# Patient Record
Sex: MALE | Race: WHITE | Employment: FULL TIME | URBAN - METROPOLITAN AREA
[De-identification: names, ages, dates, MRNs, and addresses within clinical notes are randomized per-mention and may not be internally consistent; named-entity substitution may affect disease eponyms.]

---

## 2020-01-16 ENCOUNTER — TRANSFERRED RECORDS (OUTPATIENT)
Dept: HEALTH INFORMATION MANAGEMENT | Facility: CLINIC | Age: 45
End: 2020-01-16

## 2020-01-21 ENCOUNTER — TRANSCRIBE ORDERS (OUTPATIENT)
Dept: OTHER | Age: 45
End: 2020-01-21

## 2020-01-21 ENCOUNTER — TRANSFERRED RECORDS (OUTPATIENT)
Dept: HEALTH INFORMATION MANAGEMENT | Facility: CLINIC | Age: 45
End: 2020-01-21

## 2020-01-21 ENCOUNTER — MEDICAL CORRESPONDENCE (OUTPATIENT)
Dept: HEALTH INFORMATION MANAGEMENT | Facility: CLINIC | Age: 45
End: 2020-01-21

## 2020-01-21 DIAGNOSIS — D64.9 ANEMIA: Primary | ICD-10-CM

## 2020-01-28 NOTE — TELEPHONE ENCOUNTER
ONCOLOGY INTAKE: Records Information      APPT INFORMATION: [id number] Dr. Taryn Zimmerman  Referring provider:  Dr. Sharri Rodriguez  Referring provider s clinic:  [facility]  Reason for visit/diagnosis:  Chronic Anemia and Iron Deficiency  Has patient been notified of appointment date and time?: Yes    RECORDS INFORMATION:  Were the records received with the referral (via [employer])? Yes    Has patient been seen for any external appt for this diagnosis? Yes    If yes, where? [facility]    Has patient had any imaging or procedures outside of [place] for this condition? Yes      If Yes, where? [facility]    ADDITIONAL INFORMATION:  None

## 2020-01-29 NOTE — TELEPHONE ENCOUNTER
RECORDS STATUS - ALL OTHER DIAGNOSIS      RECORDS RECEIVED FROM: [facility] and [facility]   DATE RECEIVED: 2/19/2020   NOTES STATUS DETAILS   OFFICE NOTE from referring provider Complete  Dr. Sharri Rodriguez   OFFICE NOTE from medical oncologist     DISCHARGE SUMMARY from hospital N/A    DISCHARGE REPORT from the ER     OPERATIVE REPORT N/A    MEDICATION LIST N/A    CLINICAL TRIAL TREATMENTS TO DATE     LABS     PATHOLOGY REPORTS     ANYTHING RELATED TO DIAGNOSIS Complete Report in [facility]     1/16/2020   GENONOMIC TESTING     TYPE:     IMAGING (NEED IMAGES & REPORT)     CT SCANS N/A    MRI     MAMMO     ULTRASOUND     PET       Action    Action Taken 1/29/2020 9:53am     I called pt West- his phone went to . Outside records already in Epic.

## 2020-02-19 ENCOUNTER — ONCOLOGY VISIT (OUTPATIENT)
Dept: ONCOLOGY | Facility: CLINIC | Age: 45
End: 2020-02-19
Attending: FAMILY MEDICINE
Payer: COMMERCIAL

## 2020-02-19 ENCOUNTER — PRE VISIT (OUTPATIENT)
Dept: ONCOLOGY | Facility: CLINIC | Age: 45
End: 2020-02-19

## 2020-02-19 VITALS
OXYGEN SATURATION: 97 % | TEMPERATURE: 98.4 F | WEIGHT: 155.4 LBS | HEART RATE: 90 BPM | SYSTOLIC BLOOD PRESSURE: 124 MMHG | RESPIRATION RATE: 16 BRPM | DIASTOLIC BLOOD PRESSURE: 75 MMHG

## 2020-02-19 DIAGNOSIS — D50.9 IRON DEFICIENCY ANEMIA, UNSPECIFIED IRON DEFICIENCY ANEMIA TYPE: ICD-10-CM

## 2020-02-19 DIAGNOSIS — D64.9 ANEMIA, UNSPECIFIED TYPE: Primary | ICD-10-CM

## 2020-02-19 LAB
ALBUMIN UR-MCNC: NEGATIVE MG/DL
ANISOCYTOSIS BLD QL SMEAR: ABNORMAL
APPEARANCE UR: CLEAR
BACTERIA #/AREA URNS HPF: ABNORMAL /HPF
BASOPHILS # BLD AUTO: 0.1 10E9/L (ref 0–0.2)
BASOPHILS NFR BLD AUTO: 2 %
BILIRUB UR QL STRIP: NEGATIVE
BURR CELLS BLD QL SMEAR: ABNORMAL
COLOR UR AUTO: YELLOW
DIFFERENTIAL METHOD BLD: ABNORMAL
EOSINOPHIL # BLD AUTO: 0.1 10E9/L (ref 0–0.7)
EOSINOPHIL NFR BLD AUTO: 2 %
ERYTHROCYTE [DISTWIDTH] IN BLOOD BY AUTOMATED COUNT: 21.1 % (ref 10–15)
FOLATE SERPL-MCNC: 17.4 NG/ML
GLUCOSE UR STRIP-MCNC: NEGATIVE MG/DL
HCT VFR BLD AUTO: 36.3 % (ref 40–53)
HGB BLD-MCNC: 10 G/DL (ref 13.3–17.7)
HGB UR QL STRIP: NEGATIVE
HYPOCHROMIA BLD QL: PRESENT
KETONES UR STRIP-MCNC: NEGATIVE MG/DL
LEUKOCYTE ESTERASE UR QL STRIP: NEGATIVE
LYMPHOCYTES # BLD AUTO: 3.8 10E9/L (ref 0.8–5.3)
LYMPHOCYTES NFR BLD AUTO: 56 %
MCH RBC QN AUTO: 18.1 PG (ref 26.5–33)
MCHC RBC AUTO-ENTMCNC: 27.5 G/DL (ref 31.5–36.5)
MCV RBC AUTO: 66 FL (ref 78–100)
MONOCYTES # BLD AUTO: 0.4 10E9/L (ref 0–1.3)
MONOCYTES NFR BLD AUTO: 6 %
MUCOUS THREADS #/AREA URNS LPF: PRESENT /LPF
NEUTROPHILS # BLD AUTO: 2.2 10E9/L (ref 1.6–8.3)
NEUTROPHILS NFR BLD AUTO: 34 %
NITRATE UR QL: NEGATIVE
NON-SQ EPI CELLS #/AREA URNS LPF: ABNORMAL /LPF
PH UR STRIP: 5.5 PH (ref 5–7)
PLATELET # BLD AUTO: 582 10E9/L (ref 150–450)
RBC # BLD AUTO: 5.51 10E12/L (ref 4.4–5.9)
RBC #/AREA URNS AUTO: ABNORMAL /HPF
RBC INCLUSIONS BLD: SLIGHT
RETICS # AUTO: 95.9 10E9/L (ref 25–95)
RETICS/RBC NFR AUTO: 1.7 % (ref 0.5–2)
SOURCE: ABNORMAL
SP GR UR STRIP: 1.02 (ref 1–1.03)
TARGETS BLD QL SMEAR: ABNORMAL
UROBILINOGEN UR STRIP-MCNC: NORMAL MG/DL (ref 0–2)
VIT B12 SERPL-MCNC: 504 PG/ML (ref 193–986)
WBC # BLD AUTO: 6.6 10E9/L (ref 4–11)
WBC #/AREA URNS AUTO: ABNORMAL /HPF

## 2020-02-19 PROCEDURE — 82746 ASSAY OF FOLIC ACID SERUM: CPT | Performed by: INTERNAL MEDICINE

## 2020-02-19 PROCEDURE — 85060 BLOOD SMEAR INTERPRETATION: CPT | Performed by: INTERNAL MEDICINE

## 2020-02-19 PROCEDURE — 85025 COMPLETE CBC W/AUTO DIFF WBC: CPT | Performed by: INTERNAL MEDICINE

## 2020-02-19 PROCEDURE — 85045 AUTOMATED RETICULOCYTE COUNT: CPT | Performed by: INTERNAL MEDICINE

## 2020-02-19 PROCEDURE — 36415 COLL VENOUS BLD VENIPUNCTURE: CPT | Performed by: INTERNAL MEDICINE

## 2020-02-19 PROCEDURE — 99204 OFFICE O/P NEW MOD 45 MIN: CPT | Performed by: INTERNAL MEDICINE

## 2020-02-19 PROCEDURE — 82607 VITAMIN B-12: CPT | Performed by: INTERNAL MEDICINE

## 2020-02-19 PROCEDURE — 81001 URINALYSIS AUTO W/SCOPE: CPT | Performed by: INTERNAL MEDICINE

## 2020-02-19 RX ORDER — HEPARIN SODIUM,PORCINE 10 UNIT/ML
5 VIAL (ML) INTRAVENOUS
Status: CANCELLED | OUTPATIENT
Start: 2020-02-19

## 2020-02-19 RX ORDER — HEPARIN SODIUM (PORCINE) LOCK FLUSH IV SOLN 100 UNIT/ML 100 UNIT/ML
5 SOLUTION INTRAVENOUS
Status: CANCELLED | OUTPATIENT
Start: 2020-02-19

## 2020-02-19 ASSESSMENT — PAIN SCALES - GENERAL: PAINLEVEL: NO PAIN (0)

## 2020-02-19 NOTE — PATIENT INSTRUCTIONS
Labs today including urinalysis    Schedule Infed    Refer to GI for iron deficiency anemia workup    In 2 months, repeat labs and see me a few days after that

## 2020-02-19 NOTE — PROGRESS NOTES
Hematology initial visit:  Date on this visit: 2020    West Trujillo  is referred by Dr.Kelly CARLITO Rodriguez for a hematology consultation. He requires evaluation for iron deficiency anemia.    Primary Physician: Sharri Rodriguez     History Of Present Illness:  Mr. Trujillo is a 44 year old male who has a history of congenital asplenia and iron deficiency anemia since at least his teenage years.  He tells me that as a teenager he had colonoscopy, EGD and capsule endoscopy and these were unremarkable but I do not have those records.  Previously tissue transglutaminase antibodies were negative.  Hemoglobin electrophoresis was essentially unremarkable with decrease in A2 but this was at the time when he was iron deficient. He was tested for alpha globin gene deletions/duplications and these were negative.     He comes in today and tells me that he has had iron deficiency anemia since his teenage years.  In the past he has tried oral iron but he was unable to tolerate it because of severe GI upset.    He has been feeling fatigued and has some dyspnea on exertion and this is going on for quite some time.  He has had pica symptoms with craving for ice chips for more than 10 years.  He denies any restless legs.  He denies any issues with melena or hematochezia.  Denies nausea vomiting diarrhea constipation.  No B symptoms.  No new swellings.  No neuropathy.  No infections.  His primary care physician gave him immunizations related to asplenia.    ROS:  A comprehensive ROS was otherwise neg      Past Medical/Surgical History:  No past medical history on file.  No past surgical history on file.   Congenital splenia    Allergies:  Allergies as of 2020     (No Known Allergies)     Current Medications:  No current outpatient medications on file.    none    Family History:  No family history on file.    He mentions that his brother and sister were also born without a spleen.  Brother  of meningococcal meningitis at   years.  Her sister who is 40 years of age was recently found to have iron deficiency anemia and is getting iron infusions.  Patient has 2 children and he believes that his son definitely has spleen but he is not sure of his daughter.  Both are otherwise healthy.    Social History:  Social History     Socioeconomic History     Marital status:      Spouse name: Not on file     Number of children: Not on file     Years of education: Not on file     Highest education level: Not on file   Occupational History     Not on file   Social Needs     Financial resource strain: Not on file     Food insecurity:     Worry: Not on file     Inability: Not on file     Transportation needs:     Medical: Not on file     Non-medical: Not on file   Tobacco Use     Smoking status: Not on file   Substance and Sexual Activity     Alcohol use: Not on file     Drug use: Not on file     Sexual activity: Not on file   Lifestyle     Physical activity:     Days per week: Not on file     Minutes per session: Not on file     Stress: Not on file   Relationships     Social connections:     Talks on phone: Not on file     Gets together: Not on file     Attends [employer] service: Not on file     Active member of club or organization: Not on file     Attends meetings of clubs or organizations: Not on file     Relationship status: Not on file     Intimate partner violence:     Fear of current or ex partner: Not on file     Emotionally abused: Not on file     Physically abused: Not on file     Forced sexual activity: Not on file   Other Topics Concern     Not on file   Social History Narrative     Not on file   He used to smoke but quit 5 years ago.  Drinks on average 12 pack beer in a week.  He lives with his wife and kids.  He works as a .    Physical Exam:  /75   Pulse 90   Temp 98.4  F (36.9  C) (Oral)   Resp 16   Wt 70.5 kg (155 lb 6.4 oz)   SpO2 97%    Wt Readings from Last 4 Encounters:   02/19/20 70.5 kg (155 lb 6.4 oz)        CONSTITUTIONAL: no acute distress  EYES: PERRLA, there is palor but no icterus.   ENT/MOUTH: no mouth lesions. Ears normal  CVS: s1s2 no m r g .   RESPIRATORY: clear to auscultation b/l  GI: soft non tender no hepatosplenomegaly  NEURO: AAOX3  Grossly non focal neuro exam  INTEGUMENT: no obvious rashes  LYMPHATIC: no palpable cervical, supraclavicular, axillary or inguinal LAD  MUSCULOSKELETAL: Unremarkable. No bony tenderness.   EXTREMITIES: no edema  PSYCH: Mentation, mood and affect are normal. Decision making capacity is intact      Laboratory/Imaging Studies      Previous labs reviewed  Most recently on 1/17/2020 his white blood cell count was 6.4 with a hemoglobin of 9.9 and MCV 64 with RBC count 5.54.  Platelets were 606.  Comprehensive metabolic panel was unremarkable.  Ferritin was 8, TIBC 444, UIBC 435, iron 90, iron saturation 2%.      ASSESSMENT/PLAN:    Iron deficiency anemia.  We need to figure out the cause of iron deficiency anemia and I recommend a thorough GI evaluation.  We will check peripheral blood smear and I would also check B12 and folate levels.  Because he was unable to tolerate oral iron in the past due to GI upset, I believe he would benefit from intravenous iron.  We discussed the rational schedule and potential side effects of INFeD.  We will schedule it in the next few days.  Going forward I recommend checking labs in a couple of months.    Congenital Asplenia-fortunately he never has had serious infections due to asplenia.  His primary care physician has given him appropriate immunizations.  I wonder if the iron deficiency anemia and congenital asplenia are somewhat related to each other and I did a literature search and found a couple of interesting case reports mentioning about association of arteriovenous malformation of the gut with congenital asplenia.    A Rare Association of Congenital Asplenia with Jejunal Arteriovenous Malformation.  Am J Case Rep. 2017 Oct  19;18:0376-7243.     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  Eur J Gastroenterol Hepatol. 2011 Nov;23(12):1258-61.     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 He will follow with PCP regarding any further workup with might be recommended based on this.      I will refer him to GI as mentioned above.    Thrombocytosis.  This is likely from iron deficiency anemia as well as asplenia.  We will check peripheral blood smear as mentioned above.    I will see him back in a couple of months with repeat labs.    All questions answered.  He is agreeable and comfortable with the plan.    Taryn Zimmerman MD

## 2020-02-19 NOTE — PROGRESS NOTES
Oncology Rooming Note    February 19, 2020 2:01 PM   West Trujillo is a 44 year old male who presents for:    Chief Complaint   Patient presents with     Oncology Clinic Visit     follow up     Initial Vitals: /75   Pulse 90   Temp 98.4  F (36.9  C) (Oral)   Resp 16   Wt 70.5 kg (155 lb 6.4 oz)   SpO2 97%  There is no height or weight on file to calculate BMI. There is no height or weight on file to calculate BSA.  No Pain (0) Comment: Data Unavailable   No LMP for male patient.  Allergies reviewed: Yes  Medications reviewed: Yes    Medications: Medication refills not needed today.  Pharmacy name entered into EPIC: Data Unavailable    Clinical concerns: Shortness of breath seems to be increasing over last year. Dr Zimmerman was notified.      Starr Perez RN

## 2020-02-19 NOTE — PROGRESS NOTES
Provided education on Infed using [employer] handout dated 2/19/2020.  Discussed side effects, including infusion related reactions.  Patient brought to scheduling to make follow up appointments.  Labs to be drawn today.      Nikki Swift RN-BSN, PHN, OCN  [facility]

## 2020-02-20 LAB — COPATH REPORT: NORMAL

## 2020-02-26 ENCOUNTER — OFFICE VISIT (OUTPATIENT)
Dept: GASTROENTEROLOGY | Facility: CLINIC | Age: 45
End: 2020-02-26
Attending: INTERNAL MEDICINE
Payer: COMMERCIAL

## 2020-02-26 VITALS
WEIGHT: 156.7 LBS | SYSTOLIC BLOOD PRESSURE: 119 MMHG | HEART RATE: 91 BPM | DIASTOLIC BLOOD PRESSURE: 71 MMHG | OXYGEN SATURATION: 98 % | HEIGHT: 63 IN | BODY MASS INDEX: 27.77 KG/M2

## 2020-02-26 DIAGNOSIS — D50.9 IRON DEFICIENCY ANEMIA, UNSPECIFIED IRON DEFICIENCY ANEMIA TYPE: Primary | ICD-10-CM

## 2020-02-26 PROCEDURE — 83516 IMMUNOASSAY NONANTIBODY: CPT | Performed by: PHYSICIAN ASSISTANT

## 2020-02-26 PROCEDURE — 82784 ASSAY IGA/IGD/IGG/IGM EACH: CPT | Performed by: PHYSICIAN ASSISTANT

## 2020-02-26 PROCEDURE — 99203 OFFICE O/P NEW LOW 30 MIN: CPT | Performed by: PHYSICIAN ASSISTANT

## 2020-02-26 PROCEDURE — 36415 COLL VENOUS BLD VENIPUNCTURE: CPT | Performed by: PHYSICIAN ASSISTANT

## 2020-02-26 ASSESSMENT — MIFFLIN-ST. JEOR: SCORE: 1495.92

## 2020-02-26 ASSESSMENT — PAIN SCALES - GENERAL: PAINLEVEL: NO PAIN (0)

## 2020-02-26 NOTE — NURSING NOTE
"West Trujillo's goals for this visit include:   Chief Complaint   Patient presents with     New Patient     iron deficiency anemia       He requests these members of his care team be copied on today's visit information: yes    PCP: Sharri Rodriguez    Referring Provider:  Taryn Zimmerman MD  [address] [place] [id number]    /71 (BP Location: Left arm, Patient Position: Sitting, Cuff Size: Adult Regular)   Pulse 91   Ht 1.6 m (5' 3\")   Wt 71.1 kg (156 lb 11.2 oz)   SpO2 98%   BMI 27.76 kg/m      Do you need any medication refills at today's visit? No  Lee Kennedy student MA      "

## 2020-02-26 NOTE — PROGRESS NOTES
GASTROENTEROLOGY NEW PATIENT CLINIC VISIT    CC/REFERRING MD:    Sharri Rodriguez    REASON FOR CONSULTATION:   Referred by Taryn Zimmerman for New Patient (iron deficiency anemia)    HISTORY OF PRESENT ILLNESS:    West Trujillo is 44 year old male with congenital asplenia presents for evaluation of iron def anemia. He notes long history of anemia since adolescence. He does recall work up at that time which included an EGD and colonoscopy which he states was normal. He does not have any digestive complaints. He denies any rectal bleeding, black stools, n/v, hematemesis, reflux,constipation or diarrhea. He denies any unexpected weight loss. No hematuria or other urology symptoms. He does have some fatigue and pica symptoms. He is being scheduled for iron infusions. No previous abdominal surgeries. He is a former smoker. Denies alcohol. No NSAID use. He is not on any medications at this time. His sister also has iron def anemia. He notes that both he and his sister were born without a spleen.           PERTINENT PAST MEDICAL HISTORY:  (I personally reviewed this history with the patient at today's visit)   Past Medical History:   Diagnosis Date     Congenital asplenia      Iron deficiency anemia          PREVIOUS SURGERIES: (I personally reviewed this history with the patient at today's visit)   Past Surgical History:   Procedure Laterality Date     TONSILLECTOMY         ALLERGIES:   No Known Allergies    PERTINENT MEDICATIONS:  No current outpatient medications on file.    SOCIAL HISTORY:  Social History     Socioeconomic History     Marital status:      Spouse name: Not on file     Number of children: Not on file     Years of education: Not on file     Highest education level: Not on file   Occupational History     Not on file   Social Needs     Financial resource strain: Not on file     Food insecurity:     Worry: Not on file     Inability: Not on file     Transportation needs:     Medical:  "Not on file     Non-medical: Not on file   Tobacco Use     Smoking status: Former Smoker   Substance and Sexual Activity     Alcohol use: Not Currently     Drug use: Not on file     Sexual activity: Not on file   Lifestyle     Physical activity:     Days per week: Not on file     Minutes per session: Not on file     Stress: Not on file   Relationships     Social connections:     Talks on phone: Not on file     Gets together: Not on file     Attends Sikhism service: Not on file     Active member of club or organization: Not on file     Attends meetings of clubs or organizations: Not on file     Relationship status: Not on file     Intimate partner violence:     Fear of current or ex partner: Not on file     Emotionally abused: Not on file     Physically abused: Not on file     Forced sexual activity: Not on file   Other Topics Concern     Parent/sibling w/ CABG, MI or angioplasty before 65F 55M? Not Asked   Social History Narrative     Not on file       FAMILY HISTORY: (I personally reviewed this history with the patient at today's visit)  Family History   Problem Relation Age of Onset     Prostate Cancer Father      Lung Cancer Paternal Uncle         ROS:    No fevers or chills  No weight loss  No blurry vision, double vision or change in vision  No sore throat  No lymphadenopathy  No headache, paraesthesias, or weakness in a limb  No shortness of breath or wheezing  No chest pain or pressure  No arthralgias or myalgias  No rashes or skin changes  No odynophagia or dysphagia  No BRBPR, hematochezia, melena  No dysuria, frequency or urgency  No hot/cold intolerance or polyria  No anxiety or depression  PHYSICAL EXAMINATION:  Constitutional: aaox3, cooperative, pleasant, not dyspneic/diaphoretic, no acute distress  Vitals reviewed: /71 (BP Location: Left arm, Patient Position: Sitting, Cuff Size: Adult Regular)   Pulse 91   Ht 1.6 m (5' 3\")   Wt 71.1 kg (156 lb 11.2 oz)   SpO2 98%   BMI 27.76 kg/m     Wt: "   Wt Readings from Last 2 Encounters:   02/26/20 71.1 kg (156 lb 11.2 oz)   02/19/20 70.5 kg (155 lb 6.4 oz)          Eyes: Sclera anicteric/injected  Ears/nose/mouth/throat: Normal oropharynx without ulcers or exudate, mucus membranes moist  Neck: supple  CV: No edema, RRR  Respiratory: Unlabored breathing, CTAB  Abd:  Nondistended, no masses, +bs, no hepatosplenomegaly, nontender, no peritoneal signs  Skin: warm, perfused, no jaundice  Psych: Normal affect  MSK: Normal gait      ASSESSMENT/PLAN:    West Trujillo is a 44 year old male with congenital asplenia who presents for evaluation of iron def anemia. Referred to our GI office by oncology for GI work up. He notes FLOWER since adolescence. He has hx of extensive work up in the past without specific cause for his anemia. Previous EGD/colonoscopy as a teenager which per pt report was normal. No records are available. Recommended repeat GI work up at this time. EGD/colonoscopy orders placed. If unremarkable, then further evaluate with video capsule endoscopy to eval small bowel. Will also check celiac serology at this time.     Per oncology note, there is question whether FLOWER and congential asplenia are related as case reports have mentioned association with AVMs. See recent oncology note for more detail regarding case reports.     Further recommendations after EGD/colonoscopy.     Iron deficiency anemia, unspecified iron deficiency anemia type      Orders Placed This Encounter   Procedures     Tissue transglutaminase steff IgA and IgG     IgA     GASTROENTEROLOGY ADULT REF PROCEDURE ONLY [facility] [phone]; [facility] GI           Thank you for this consultation.  It was a pleasure to participate in the care of this patient; please contact us with any further questions.     This note was created with voice recognition software, and while reviewed for accuracy, typos may remain.     Ivory Stevens PA-C  Gastroenterology  [facility]  Clinics

## 2020-02-26 NOTE — LETTER
2/26/2020         RE: West Trujillo  [address] [place] [facility] [address]        Dear Colleague,    Thank you for referring your patient, West Trujillo, to the [facility]. Please see a copy of my visit note below.            GASTROENTEROLOGY NEW PATIENT CLINIC VISIT    CC/REFERRING MD:    Sharri Rodriguez    REASON FOR CONSULTATION:   Referred by Taryn Zimmerman for New Patient (iron deficiency anemia)    HISTORY OF PRESENT ILLNESS:    West Trujillo is 44 year old male with congenital asplenia presents for evaluation of iron def anemia. He notes long history of anemia since adolescence. He does recall work up at that time which included an EGD and colonoscopy which he states was normal. He does not have any digestive complaints. He denies any rectal bleeding, black stools, n/v, hematemesis, reflux,constipation or diarrhea. He denies any unexpected weight loss. No hematuria or other urology symptoms. He does have some fatigue and pica symptoms. He is being scheduled for iron infusions. No previous abdominal surgeries. He is a former smoker. Denies alcohol. No NSAID use. He is not on any medications at this time. His sister also has iron def anemia. He notes that both he and his sister were born without a spleen.           PERTINENT PAST MEDICAL HISTORY:  (I personally reviewed this history with the patient at today's visit)   Past Medical History:   Diagnosis Date     Congenital asplenia      Iron deficiency anemia          PREVIOUS SURGERIES: (I personally reviewed this history with the patient at today's visit)   Past Surgical History:   Procedure Laterality Date     TONSILLECTOMY         ALLERGIES:   No Known Allergies    PERTINENT MEDICATIONS:  No current outpatient medications on file.    SOCIAL HISTORY:  Social History     Socioeconomic History     Marital status:      Spouse name: Not on file     Number of children: Not on file     Years of education: Not on file      Highest education level: Not on file   Occupational History     Not on file   Social Needs     Financial resource strain: Not on file     Food insecurity:     Worry: Not on file     Inability: Not on file     Transportation needs:     Medical: Not on file     Non-medical: Not on file   Tobacco Use     Smoking status: Former Smoker   Substance and Sexual Activity     Alcohol use: Not Currently     Drug use: Not on file     Sexual activity: Not on file   Lifestyle     Physical activity:     Days per week: Not on file     Minutes per session: Not on file     Stress: Not on file   Relationships     Social connections:     Talks on phone: Not on file     Gets together: Not on file     Attends Jehovah's witness service: Not on file     Active member of club or organization: Not on file     Attends meetings of clubs or organizations: Not on file     Relationship status: Not on file     Intimate partner violence:     Fear of current or ex partner: Not on file     Emotionally abused: Not on file     Physically abused: Not on file     Forced sexual activity: Not on file   Other Topics Concern     Parent/sibling w/ CABG, MI or angioplasty before 65F 55M? Not Asked   Social History Narrative     Not on file       FAMILY HISTORY: (I personally reviewed this history with the patient at today's visit)  Family History   Problem Relation Age of Onset     Prostate Cancer Father      Lung Cancer Paternal Uncle         ROS:    No fevers or chills  No weight loss  No blurry vision, double vision or change in vision  No sore throat  No lymphadenopathy  No headache, paraesthesias, or weakness in a limb  No shortness of breath or wheezing  No chest pain or pressure  No arthralgias or myalgias  No rashes or skin changes  No odynophagia or dysphagia  No BRBPR, hematochezia, melena  No dysuria, frequency or urgency  No hot/cold intolerance or polyria  No anxiety or depression  PHYSICAL EXAMINATION:  Constitutional: aaox3, cooperative, pleasant, not  "dyspneic/diaphoretic, no acute distress  Vitals reviewed: /71 (BP Location: Left arm, Patient Position: Sitting, Cuff Size: Adult Regular)   Pulse 91   Ht 1.6 m (5' 3\")   Wt 71.1 kg (156 lb 11.2 oz)   SpO2 98%   BMI 27.76 kg/m      Wt:   Wt Readings from Last 2 Encounters:   02/26/20 71.1 kg (156 lb 11.2 oz)   02/19/20 70.5 kg (155 lb 6.4 oz)          Eyes: Sclera anicteric/injected  Ears/nose/mouth/throat: Normal oropharynx without ulcers or exudate, mucus membranes moist  Neck: supple  CV: No edema, RRR  Respiratory: Unlabored breathing, CTAB  Abd:  Nondistended, no masses, +bs, no hepatosplenomegaly, nontender, no peritoneal signs  Skin: warm, perfused, no jaundice  Psych: Normal affect  MSK: Normal gait      ASSESSMENT/PLAN:    West Trujillo is a 44 year old male with congenital asplenia who presents for evaluation of iron def anemia. Referred to our GI office by oncology for GI work up. He notes FLOWER since adolescence. He has hx of extensive work up in the past without specific cause for his anemia. Previous EGD/colonoscopy as a teenager which per pt report was normal. No records are available. Recommended repeat GI work up at this time. EGD/colonoscopy orders placed. If unremarkable, then further evaluate with video capsule endoscopy to eval small bowel. Will also check celiac serology at this time.     Per oncology note, there is question whether FLOWER and congential asplenia are related as case reports have mentioned association with AVMs. See recent oncology note for more detail regarding case reports.     Further recommendations after EGD/colonoscopy.     Iron deficiency anemia, unspecified iron deficiency anemia type      Orders Placed This Encounter   Procedures     Tissue transglutaminase steff IgA and IgG     IgA     GASTROENTEROLOGY ADULT REF PROCEDURE ONLY [facility] [phone]; [facility] GI           Thank you for this consultation.  It was a pleasure to participate in the care of this " patient; please contact us with any further questions.     This note was created with voice recognition software, and while reviewed for accuracy, typos may remain.     Ivory Stevens PA-C  Gastroenterology  [facility]      Again, thank you for allowing me to participate in the care of your patient.        Sincerely,        Ivory Stevens PA-C

## 2020-02-26 NOTE — PATIENT INSTRUCTIONS
Thank you for visiting our Gastroenterology office today.     Please call [phone] to schedule an upper endoscopy and colonoscopy with Dr. Zapata or Dr. Berman.     Schedule a follow up for 2-3 weeks after your procedure to review results and discuss next steps if any are needed.     If your upper endoscopy and colonoscopy do not find an explanation for your anemia, then we should proceed with a video capsule endoscopy (pill capsule).

## 2020-02-27 LAB
IGA SERPL-MCNC: 372 MG/DL (ref 84–499)
TTG IGA SER-ACNC: 1 U/ML
TTG IGG SER-ACNC: 1 U/ML

## 2020-03-09 ENCOUNTER — INFUSION THERAPY VISIT (OUTPATIENT)
Dept: INFUSION THERAPY | Facility: CLINIC | Age: 45
End: 2020-03-09
Payer: COMMERCIAL

## 2020-03-09 VITALS
OXYGEN SATURATION: 96 % | RESPIRATION RATE: 16 BRPM | TEMPERATURE: 98.4 F | SYSTOLIC BLOOD PRESSURE: 112 MMHG | DIASTOLIC BLOOD PRESSURE: 69 MMHG | HEART RATE: 84 BPM

## 2020-03-09 DIAGNOSIS — D50.9 IRON DEFICIENCY ANEMIA, UNSPECIFIED IRON DEFICIENCY ANEMIA TYPE: Primary | ICD-10-CM

## 2020-03-09 PROCEDURE — 96366 THER/PROPH/DIAG IV INF ADDON: CPT | Performed by: NURSE PRACTITIONER

## 2020-03-09 PROCEDURE — 96365 THER/PROPH/DIAG IV INF INIT: CPT | Performed by: NURSE PRACTITIONER

## 2020-03-09 PROCEDURE — 99207 ZZC NO CHARGE NURSE ONLY: CPT

## 2020-03-09 RX ORDER — HEPARIN SODIUM (PORCINE) LOCK FLUSH IV SOLN 100 UNIT/ML 100 UNIT/ML
5 SOLUTION INTRAVENOUS
Status: CANCELLED | OUTPATIENT
Start: 2020-03-09

## 2020-03-09 RX ORDER — HEPARIN SODIUM,PORCINE 10 UNIT/ML
5 VIAL (ML) INTRAVENOUS
Status: CANCELLED | OUTPATIENT
Start: 2020-03-09

## 2020-03-09 RX ADMIN — Medication 250 ML: at 07:42

## 2020-03-09 ASSESSMENT — PAIN SCALES - GENERAL: PAINLEVEL: NO PAIN (0)

## 2020-03-09 NOTE — PROGRESS NOTES
Infusion Nursing Note:  West Trujillo presents today for infed.    Patient seen by provider today: No   present during visit today: Not Applicable.    Intravenous Access:  Peripheral IV placed.    Treatment Conditions:  See systems review flow sheet.      Post Infusion Assessment:  Patient tolerated infusion without incident.  Blood return noted pre and post infusion.  Site patent and intact, free from redness, edema or discomfort.  No evidence of extravasations.  Access discontinued per protocol.       Discharge Plan:   Patient discharged in stable condition accompanied by: self.  Departure Mode: Ambulatory.  Reviewed follow up with Dr Zimmerman in April.    Starr Perez RN

## 2020-04-16 DIAGNOSIS — D50.9 IRON DEFICIENCY ANEMIA, UNSPECIFIED IRON DEFICIENCY ANEMIA TYPE: ICD-10-CM

## 2020-04-16 LAB
ACANTHOCYTES BLD QL SMEAR: SLIGHT
ANISOCYTOSIS BLD QL SMEAR: ABNORMAL
BASOPHILS # BLD AUTO: 0.1 10E9/L (ref 0–0.2)
BASOPHILS NFR BLD AUTO: 1 %
BURR CELLS BLD QL SMEAR: SLIGHT
DIFFERENTIAL METHOD BLD: ABNORMAL
EOSINOPHIL # BLD AUTO: 0.2 10E9/L (ref 0–0.7)
EOSINOPHIL NFR BLD AUTO: 3.2 %
ERYTHROCYTE [DISTWIDTH] IN BLOOD BY AUTOMATED COUNT: ABNORMAL % (ref 10–15)
FERRITIN SERPL-MCNC: 22 NG/ML (ref 26–388)
HCT VFR BLD AUTO: 43.8 % (ref 40–53)
HGB BLD-MCNC: 13.8 G/DL (ref 13.3–17.7)
HOWELL-JOLLY BOD BLD QL SMEAR: PRESENT
IRON SATN MFR SERPL: 22 % (ref 15–46)
IRON SERPL-MCNC: 79 UG/DL (ref 35–180)
LYMPHOCYTES # BLD AUTO: 2.5 10E9/L (ref 0.8–5.3)
LYMPHOCYTES NFR BLD AUTO: 36.6 %
MCH RBC QN AUTO: 25.1 PG (ref 26.5–33)
MCHC RBC AUTO-ENTMCNC: 31.5 G/DL (ref 31.5–36.5)
MCV RBC AUTO: 80 FL (ref 78–100)
MONOCYTES # BLD AUTO: 1.3 10E9/L (ref 0–1.3)
MONOCYTES NFR BLD AUTO: 19.1 %
NEUTROPHILS # BLD AUTO: 2.8 10E9/L (ref 1.6–8.3)
NEUTROPHILS NFR BLD AUTO: 40.1 %
PLATELET # BLD AUTO: 395 10E9/L (ref 150–450)
POIKILOCYTOSIS BLD QL SMEAR: ABNORMAL
RBC # BLD AUTO: 5.5 10E12/L (ref 4.4–5.9)
RBC INCLUSIONS BLD: SLIGHT
RETICS # AUTO: 47.4 10E9/L (ref 25–95)
RETICS/RBC NFR AUTO: 0.9 % (ref 0.5–2)
TIBC SERPL-MCNC: 353 UG/DL (ref 240–430)
WBC # BLD AUTO: 6.9 10E9/L (ref 4–11)

## 2020-04-16 PROCEDURE — 85025 COMPLETE CBC W/AUTO DIFF WBC: CPT | Performed by: INTERNAL MEDICINE

## 2020-04-16 PROCEDURE — 83550 IRON BINDING TEST: CPT | Performed by: INTERNAL MEDICINE

## 2020-04-16 PROCEDURE — 85045 AUTOMATED RETICULOCYTE COUNT: CPT | Performed by: INTERNAL MEDICINE

## 2020-04-16 PROCEDURE — 82728 ASSAY OF FERRITIN: CPT | Performed by: INTERNAL MEDICINE

## 2020-04-16 PROCEDURE — 36415 COLL VENOUS BLD VENIPUNCTURE: CPT | Performed by: INTERNAL MEDICINE

## 2020-04-16 PROCEDURE — 83540 ASSAY OF IRON: CPT | Performed by: INTERNAL MEDICINE

## 2020-04-21 ENCOUNTER — VIRTUAL VISIT (OUTPATIENT)
Dept: ONCOLOGY | Facility: CLINIC | Age: 45
End: 2020-04-21
Attending: INTERNAL MEDICINE
Payer: COMMERCIAL

## 2020-04-21 VITALS — HEIGHT: 64 IN | BODY MASS INDEX: 25.61 KG/M2 | WEIGHT: 150 LBS

## 2020-04-21 DIAGNOSIS — D50.9 IRON DEFICIENCY ANEMIA, UNSPECIFIED IRON DEFICIENCY ANEMIA TYPE: Primary | ICD-10-CM

## 2020-04-21 PROCEDURE — 99213 OFFICE O/P EST LOW 20 MIN: CPT | Mod: TEL | Performed by: INTERNAL MEDICINE

## 2020-04-21 ASSESSMENT — PAIN SCALES - GENERAL: PAINLEVEL: NO PAIN (0)

## 2020-04-21 ASSESSMENT — MIFFLIN-ST. JEOR: SCORE: 1481.4

## 2020-04-21 NOTE — PROGRESS NOTES
Hematology follow up visit:  Date on this visit: 4/21/2020     West Trujillo  is referred by Dr.Kelly CARLITO Rodriguez for a hematology consultation. He requires evaluation for iron deficiency anemia.    Primary Physician: Sharri Rodriguez     History Of Present Illness:    Please see my previous note for details.  I have copied and updated from prior note.  Mr. Trujillo is a 44 year old male who has a history of congenital asplenia and iron deficiency anemia since at least his teenage years.  He tells me that as a teenager he had colonoscopy, EGD and capsule endoscopy and these were unremarkable but I do not have those records.  Previously tissue transglutaminase antibodies were negative.  Hemoglobin electrophoresis was essentially unremarkable with decrease in A2 but this was at the time when he was iron deficient. He was tested for alpha globin gene deletions/duplications and these were negative.     He told me that he has had iron deficiency anemia since his teenage years.  In the past he has tried oral iron but he was unable to tolerate it because of severe GI upset.    He has been feeling fatigued and has some dyspnea on exertion and this is going on for quite some time.  He has had pica symptoms with craving for ice chips for more than 10 years.  He denies any restless legs.  He denies any issues with melena or hematochezia.  Denies nausea vomiting diarrhea constipation.   His primary care physician has given him immunizations related to asplenia.    I had recommended Infed for symptomatic iron deficiency anemia.  I had also recommended that he follow with gastroenterologist for a thorough GI work-up to determine the cause of iron deficiency anemia.  There have been some reports about AV malformations associated with congenital asplenia.  He received INFeD on 3/9/2020.    Most recent blood work on 4/16/2020 shows that his hemoglobin has normalized to 13.8 and MCV is now 80.  Ferritin is 22 and TIBC has decreased to 353 and  iron saturation index is 22%.    He was seen by gastroenterologist and there was plan to do EGD and colonoscopy and possibly capsule endoscopy as well but it has not been done as of yet.    Interval history.    This is a telephone visit.  He feels good. Tiredness is better. He has mild dyspnea on exertion. Cravings for ice has resolved. No bleeding. No GI issues. No infections. He has not scheduled his EGD/Colonoscopy because of Covid 19 situation.       ROS:  Rest of the comprehensive review of the system was essentially unremarkable.    I reviewed other history in epic as below.    Past Medical/Surgical History:  Past Medical History:   Diagnosis Date     Congenital asplenia      Iron deficiency anemia      Past Surgical History:   Procedure Laterality Date     TONSILLECTOMY        Congenital splenia    Allergies:  Allergies as of 2020     (No Known Allergies)     Current Medications:  No current outpatient medications on file.    none    Family History:  Family History   Problem Relation Age of Onset     Prostate Cancer Father      Lung Cancer Paternal Uncle        He mentions that his brother and sister were also born without a spleen.  Brother  of meningococcal meningitis at 1-1/2 years.  Her sister who is 40 years of age was recently found to have iron deficiency anemia and is getting iron infusions.  Patient has 2 children and he believes that his son definitely has spleen but he is not sure of his daughter.  Both are otherwise healthy.    Social History:  Social History     Socioeconomic History     Marital status:      Spouse name: Not on file     Number of children: Not on file     Years of education: Not on file     Highest education level: Not on file   Occupational History     Not on file   Social Needs     Financial resource strain: Not on file     Food insecurity     Worry: Not on file     Inability: Not on file     Transportation needs     Medical: Not on file     Non-medical: Not on  file   Tobacco Use     Smoking status: Former Smoker   Substance and Sexual Activity     Alcohol use: Not Currently     Drug use: Not on file     Sexual activity: Not on file   Lifestyle     Physical activity     Days per week: Not on file     Minutes per session: Not on file     Stress: Not on file   Relationships     Social connections     Talks on phone: Not on file     Gets together: Not on file     Attends L[employer] service: Not on file     Active member of club or organization: Not on file     Attends meetings of clubs or organizations: Not on file     Relationship status: Not on file     Intimate partner violence     Fear of current or ex partner: Not on file     Emotionally abused: Not on file     Physically abused: Not on file     Forced sexual activity: Not on file   Other Topics Concern     Parent/sibling w/ CABG, MI or angioplasty before 65F 55M? Not Asked   Social History Narrative     Not on file   He used to smoke but quit 5 years ago.  Drinks on average 12 pack beer in a week.  He lives with his wife and kids.  He works as a .    Physical Exam:  There were no vitals taken for this visit.   Wt Readings from Last 4 Encounters:   02/26/20 71.1 kg (156 lb 11.2 oz)   02/19/20 70.5 kg (155 lb 6.4 oz)       Constitutional.  Does not seem to be in any acute distress.  Respiratory.  Speaking in full sentences.  No coughing noted.  Neurological.  Alert and oriented x3.  Psychiatric.  Mood, mentation and affect are normal.  Decision making capacity is intact.      The rest of a comprehensive physical examination is deferred due to PHE (public health emergency) telephone visit restrictions.    Laboratory/Imaging Studies    Labs reviewed    Results for DELFIN GREGORY (MRN [id number]) as of 4/21/2020 15:58   Ref. Range 4/16/2020 14:33   Ferritin Latest Ref Range: 26 - 388 ng/mL 22 (L)   Iron Latest Ref Range: 35 - 180 ug/dL 79   Iron Binding Cap Latest Ref Range: 240 - 430 ug/dL 353   Iron Saturation Index  Latest Ref Range: 15 - 46 % 22   WBC Latest Ref Range: 4.0 - 11.0 10e9/L 6.9   Hemoglobin Latest Ref Range: 13.3 - 17.7 g/dL 13.8   Hematocrit Latest Ref Range: 40.0 - 53.0 % 43.8   Platelet Count Latest Ref Range: 150 - 450 10e9/L 395   RBC Count Latest Ref Range: 4.4 - 5.9 10e12/L 5.50   MCV Latest Ref Range: 78 - 100 fl 80   MCH Latest Ref Range: 26.5 - 33.0 pg 25.1 (L)   MCHC Latest Ref Range: 31.5 - 36.5 g/dL 31.5   RDW Latest Ref Range: 10.0 - 15.0 % Unable to Calculate   Diff Method Unknown Automated Method   % Neutrophils Latest Units: % 40.1   % Lymphocytes Latest Units: % 36.6   % Monocytes Latest Units: % 19.1   % Eosinophils Latest Units: % 3.2   % Basophils Latest Units: % 1.0   Absolute Neutrophil Latest Ref Range: 1.6 - 8.3 10e9/L 2.8   Absolute Lymphocytes Latest Ref Range: 0.8 - 5.3 10e9/L 2.5   Absolute Monocytes Latest Ref Range: 0.0 - 1.3 10e9/L 1.3   Absolute Eosinophils Latest Ref Range: 0.0 - 0.7 10e9/L 0.2   Absolute Basophils Latest Ref Range: 0.0 - 0.2 10e9/L 0.1   Anisocytosis Unknown Moderate   Poikilocytosis Unknown Moderate   RBC Fragments Unknown Slight   Acanthocytes Unknown Slight   [place] Cells Unknown Slight   [place]oma[place] Floral Bodies Unknown Present   % Retic Latest Ref Range: 0.5 - 2.0 % 0.9   Absolute Retic Latest Ref Range: 25 - 95 10e9/L 47.4         On 1/17/2020 his white blood cell count was 6.4 with a hemoglobin of 9.9 and MCV 64 with RBC count 5.54.  Platelets were 606.  Comprehensive metabolic panel was unremarkable.  Ferritin was 8, TIBC 444, UIBC 435, iron 90, iron saturation 2%.          ASSESSMENT/PLAN:    Iron deficiency anemia.      Previously he was unable to tolerate oral iron.  We gave him INFeD on 3/9/2020 which has normalized his hemoglobin and his iron studies are also better.  He was evaluated by gastroenterologist and the plan was to do upper and lower GI endoscopies and then possibly video capsule endoscopy depending on what the endoscopy showed.        He is now  doing well.  At this time I would recommend that he tries Proferrin which is heme iron which is absorbed better so much lower dose of iron is needed so the chances of GI side effects are also less.    I plan to repeat labs in 2 months.  If he is unable to tolerate Proferrin or if he becomes iron deficient/anemic again then we will consider repeat INFeD.    He will schedule the work-up with GI after the situation settles down regarding COVID-19 pandemic.    Thrombocytosis.  This has corrected after correcting the iron deficiency anemia.  This suggests that it was reactive.  We will continue to observe.    Shortness of breath.  He still feels some shortness of breath which is unchanged after receiving IV iron and after getting the hemoglobin normalized.  I do not have a clear reason why does he still feel like that.  I recommend that he follows up with his primary care physician regarding this.        We did not address the following today.  Congenital Asplenia-fortunately he never has had serious infections due to asplenia.  His primary care physician has given him appropriate immunizations.  I wonder if the iron deficiency anemia and congenital asplenia are somewhat related to each other and I did a literature search and found a couple of interesting case reports mentioning about association of arteriovenous malformation of the gut with congenital asplenia.    A Rare Association of Congenital Asplenia with Jejunal Arteriovenous Malformation.  Am J Case Rep. 2017 Oct 19;18:0274-6465.     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  Eur J Gastroenterol Hepatol. 2011 Nov;23(12):1258-61.     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 He will follow with PCP regarding any further workup with might be recommended based on this.      Repeat labs in 2 months and see me after that.    All questions  answered.  He is agreeable and comfortable with the plan.    Taryn Zimmerman MD     Total phone call time.  16 minutes.

## 2020-04-21 NOTE — PROGRESS NOTES
"West Trujillo is a 44 year old male who is being evaluated via a billable telephone visit.      The patient has been notified of following:     \"This telephone visit will be conducted via a call between you and your physician/provider. We have found that certain health care needs can be provided without the need for a physical exam.  This service lets us provide the care you need with a short phone conversation.  If a prescription is necessary we can send it directly to your pharmacy.  If lab work is needed we can place an order for that and you can then stop by our lab to have the test done at a later time.    Telephone visits are billed at different rates depending on your insurance coverage. During this emergency period, for some insurers they may be billed the same as an in-person visit.  Please reach out to your insurance provider with any questions.    If during the course of the call the physician/provider feels a telephone visit is not appropriate, you will not be charged for this service.\"    Patient has given verbal consent for Telephone visit?  Yes    How would you like to obtain your AVS? Mail a copy    Karyna Flowers LPN on 4/21/2020 at 4:09 PM    "

## 2020-04-21 NOTE — NURSING NOTE
SYMPTOM ASSESSMENT    Pain: no    Nausea/Vomiting: no    Mouth Sores: no    Shortness of Breath: no    Smoking: no    Fever or Chills: no    Hard Stools: no    Soft Stools: no    Weight Loss: no    Weakness: no    Burning, numbness or tingling in hands or feet: no    Problems with skin or swelling: no    Memory Loss: no    Anxiety or Depression: no    Trouble Sleeping: no    Karyna Flowers LPN on 4/21/2020 at 4:08 PM

## 2020-04-30 ENCOUNTER — PATIENT OUTREACH (OUTPATIENT)
Dept: ONCOLOGY | Facility: CLINIC | Age: 45
End: 2020-04-30

## 2020-04-30 NOTE — TELEPHONE ENCOUNTER
Attempted to return call to West.  Voicemail left stating writer will attempt to call patient again in the near future.

## 2020-05-01 NOTE — TELEPHONE ENCOUNTER
Writer connected with patient.  He wanted to ask if he could possibly benefit from another iron infusion as his Hgb is at the low limit of normal at 13.8.  He states he continues to feel tired and still notes SOB.  Advised will discuss with Dr. Zimmerman and get back to him with his opinion.

## 2020-05-01 NOTE — TELEPHONE ENCOUNTER
Left message on West's voicemail with the following information/recommendations from Dr. Zimmerman:  He advised to try Proferrin oral tablets - this is known to be better tolerated than Ferrous sulfate; if he continues to have GI upset, then can consider giving another dose of Infed.  Regarding the shortness of breath, he is not sure why he is still having this side effect as his labs have stabilized; he should follow with his PCP.  Advised to schedule follow-up with  and labs a couple of days before.

## 2021-06-21 ENCOUNTER — PATIENT OUTREACH (OUTPATIENT)
Dept: ONCOLOGY | Facility: CLINIC | Age: 46
End: 2021-06-21

## 2021-06-21 NOTE — PROGRESS NOTES
Dr. Payne asking to fax most recent office visit note to her office.  She is also asking what Dr. 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